# Patient Record
Sex: MALE | Race: BLACK OR AFRICAN AMERICAN | Employment: OTHER | URBAN - METROPOLITAN AREA
[De-identification: names, ages, dates, MRNs, and addresses within clinical notes are randomized per-mention and may not be internally consistent; named-entity substitution may affect disease eponyms.]

---

## 2023-12-13 ENCOUNTER — HOSPITAL ENCOUNTER (EMERGENCY)
Age: 63
Discharge: HOME | End: 2023-12-13
Attending: EMERGENCY MEDICINE
Payer: COMMERCIAL

## 2023-12-13 VITALS
SYSTOLIC BLOOD PRESSURE: 174 MMHG | RESPIRATION RATE: 17 BRPM | HEART RATE: 79 BPM | TEMPERATURE: 97.7 F | WEIGHT: 315 LBS | OXYGEN SATURATION: 96 % | DIASTOLIC BLOOD PRESSURE: 71 MMHG

## 2023-12-13 DIAGNOSIS — N18.5 ANEMIA DUE TO STAGE 5 CHRONIC KIDNEY DISEASE, NOT ON CHRONIC DIALYSIS (HCC): Primary | ICD-10-CM

## 2023-12-13 DIAGNOSIS — D63.1 ANEMIA DUE TO STAGE 5 CHRONIC KIDNEY DISEASE, NOT ON CHRONIC DIALYSIS (HCC): Primary | ICD-10-CM

## 2023-12-13 LAB
ABO + RH BLD: NORMAL
ACANTHOCYTES BLD QL SMEAR: ABNORMAL
ANION GAP SERPL CALCULATED.3IONS-SCNC: 17 MMOL/L (ref 3–16)
ANISOCYTOSIS BLD QL SMEAR: ABNORMAL
BASOPHILS # BLD: 0 K/UL (ref 0–0.2)
BASOPHILS NFR BLD: 1.2 %
BLD GP AB SCN SERPL QL: NORMAL
BLOOD BANK DISPENSE STATUS: NORMAL
BLOOD BANK PRODUCT CODE: NORMAL
BPU ID: NORMAL
BUN SERPL-MCNC: 108 MG/DL (ref 7–20)
CALCIUM SERPL-MCNC: 7.3 MG/DL (ref 8.3–10.6)
CHLORIDE SERPL-SCNC: 104 MMOL/L (ref 99–110)
CO2 SERPL-SCNC: 16 MMOL/L (ref 21–32)
CREAT SERPL-MCNC: 11.5 MG/DL (ref 0.8–1.3)
DACRYOCYTES BLD QL SMEAR: ABNORMAL
DEPRECATED RDW RBC AUTO: 16.2 % (ref 12.4–15.4)
DESCRIPTION BLOOD BANK: NORMAL
EOSINOPHIL # BLD: 0.3 K/UL (ref 0–0.6)
EOSINOPHIL NFR BLD: 6.9 %
GFR SERPLBLD CREATININE-BSD FMLA CKD-EPI: 4 ML/MIN/{1.73_M2}
GLUCOSE SERPL-MCNC: 110 MG/DL (ref 70–99)
HCT VFR BLD AUTO: 19 % (ref 40.5–52.5)
HGB BLD-MCNC: 6.2 G/DL (ref 13.5–17.5)
LYMPHOCYTES # BLD: 1 K/UL (ref 1–5.1)
LYMPHOCYTES NFR BLD: 24.9 %
MCH RBC QN AUTO: 27.6 PG (ref 26–34)
MCHC RBC AUTO-ENTMCNC: 32.7 G/DL (ref 31–36)
MCV RBC AUTO: 84.2 FL (ref 80–100)
MICROCYTES BLD QL SMEAR: ABNORMAL
MONOCYTES # BLD: 0.5 K/UL (ref 0–1.3)
MONOCYTES NFR BLD: 12.5 %
NEUTROPHILS # BLD: 2.2 K/UL (ref 1.7–7.7)
NEUTROPHILS NFR BLD: 54.5 %
OVALOCYTES BLD QL SMEAR: ABNORMAL
PLATELET # BLD AUTO: 179 K/UL (ref 135–450)
PMV BLD AUTO: 7.5 FL (ref 5–10.5)
POTASSIUM SERPL-SCNC: 5.5 MMOL/L (ref 3.5–5.1)
RBC # BLD AUTO: 2.25 M/UL (ref 4.2–5.9)
SCHISTOCYTES BLD QL SMEAR: ABNORMAL
SODIUM SERPL-SCNC: 137 MMOL/L (ref 136–145)
WBC # BLD AUTO: 4 K/UL (ref 4–11)

## 2023-12-13 PROCEDURE — P9016 RBC LEUKOCYTES REDUCED: HCPCS

## 2023-12-13 PROCEDURE — 86900 BLOOD TYPING SEROLOGIC ABO: CPT

## 2023-12-13 PROCEDURE — 86850 RBC ANTIBODY SCREEN: CPT

## 2023-12-13 PROCEDURE — 36430 TRANSFUSION BLD/BLD COMPNT: CPT

## 2023-12-13 PROCEDURE — 86901 BLOOD TYPING SEROLOGIC RH(D): CPT

## 2023-12-13 PROCEDURE — 80048 BASIC METABOLIC PNL TOTAL CA: CPT

## 2023-12-13 PROCEDURE — 93005 ELECTROCARDIOGRAM TRACING: CPT

## 2023-12-13 PROCEDURE — 86923 COMPATIBILITY TEST ELECTRIC: CPT

## 2023-12-13 PROCEDURE — 85025 COMPLETE CBC W/AUTO DIFF WBC: CPT

## 2023-12-13 PROCEDURE — 36415 COLL VENOUS BLD VENIPUNCTURE: CPT

## 2023-12-13 PROCEDURE — 99285 EMERGENCY DEPT VISIT HI MDM: CPT

## 2023-12-13 RX ORDER — SODIUM CHLORIDE 9 MG/ML
INJECTION, SOLUTION INTRAVENOUS PRN
Status: DISCONTINUED | OUTPATIENT
Start: 2023-12-13 | End: 2023-12-14 | Stop reason: HOSPADM

## 2023-12-13 ASSESSMENT — PAIN - FUNCTIONAL ASSESSMENT
PAIN_FUNCTIONAL_ASSESSMENT: NONE - DENIES PAIN
PAIN_FUNCTIONAL_ASSESSMENT: NONE - DENIES PAIN

## 2023-12-13 ASSESSMENT — PAIN SCALES - GENERAL
PAINLEVEL_OUTOF10: 0
PAINLEVEL_OUTOF10: 0

## 2023-12-14 LAB
EKG ATRIAL RATE: 67 BPM
EKG DIAGNOSIS: NORMAL
EKG P AXIS: 51 DEGREES
EKG P-R INTERVAL: 192 MS
EKG Q-T INTERVAL: 450 MS
EKG QRS DURATION: 98 MS
EKG QTC CALCULATION (BAZETT): 475 MS
EKG R AXIS: -9 DEGREES
EKG T AXIS: 20 DEGREES
EKG VENTRICULAR RATE: 67 BPM

## 2023-12-14 NOTE — ED NOTES
Pt discharged to home. Discharge instructions reviewed with patient. All questions answered, no concerns noted. Pt encouraged to return to emergency department if they have any new or worsening symptoms. Pt alert and oriented, resp even and unlabored, skin natural in color, no signs of acute distress.         Barbara Collins RN  12/13/23 [id number]

## 2023-12-14 NOTE — DISCHARGE INSTRUCTIONS
Today you were seen in the emergency department for anemia. We think this is all related to your chronic kidney disease. You were prescribed shots of medication that will stimulate your bone marrow to make more red blood cells. You need to talk to your nephrologist about how you can start receiving that. Additionally your potassium continues to be high. They recommended that you restart your medicine called Veltassa which can help lower your potassium levels. High levels of potassium can be dangerous I recommend you restart that. Please seek care or return to emergency department if your symptoms worsen or do not resolve. In addition, return if:  -You have a fever (greater than 101 degrees)  -You have chest pain, shortness of breath, abdominal pain, or uncontrollable vomiting  -You are unable to eat or drink  -You pass out  -You have difficulty moving your arms or legs   -You have difficulty speaking or slurred speech  -Or you have any concern that you feel needs urgent physician evaluation    Follow-Up/Future Appointments:  DILAN Shirley NP  [address]  [phone]    Schedule an appointment as soon as possible for a visit       No future appointments.   Discharge Medications:  New Prescriptions    No medications on file     Thank you for allowing me to be a part of your care today - Dr. Chato Stovall

## 2023-12-14 NOTE — CONSENT
Informed Consent for Blood Component Transfusion Note    I have discussed with the patient the rationale for blood component transfusion; its benefits in treating or preventing fatigue, organ damage, or death; and its risk which includes mild transfusion reactions, rare risk of blood borne infection, or more serious but rare reactions. I have discussed the alternatives to transfusion, including the risk and consequences of not receiving transfusion. The patient had an opportunity to ask questions and had agreed to proceed with transfusion of blood components.     Electronically signed by Shirley Holliday MD on 12/13/23 at 8:01 PM EST

## 2025-04-04 ENCOUNTER — HOSPITAL ENCOUNTER (INPATIENT)
Age: 65
LOS: 2 days | Discharge: HOME HEALTH | DRG: 640 | End: 2025-04-06
Attending: EMERGENCY MEDICINE | Admitting: STUDENT IN AN ORGANIZED HEALTH CARE EDUCATION/TRAINING PROGRAM
Payer: COMMERCIAL

## 2025-04-04 ENCOUNTER — APPOINTMENT (OUTPATIENT)
Dept: GENERAL RADIOLOGY | Age: 65
DRG: 640 | End: 2025-04-04
Payer: COMMERCIAL

## 2025-04-04 DIAGNOSIS — E87.5 HYPERKALEMIA: Primary | ICD-10-CM

## 2025-04-04 DIAGNOSIS — Z98.890 S/P RIGHT KNEE SURGERY: ICD-10-CM

## 2025-04-04 PROBLEM — Z99.2 ESRD ON DIALYSIS (HCC): Status: ACTIVE | Noted: 2025-04-04

## 2025-04-04 PROBLEM — N18.6 ESRD ON DIALYSIS (HCC): Status: ACTIVE | Noted: 2025-04-04

## 2025-04-04 LAB
ALBUMIN SERPL-MCNC: 3.4 G/DL (ref 3.4–5)
ALBUMIN/GLOB SERPL: 1 {RATIO} (ref 1.1–2.2)
ALP SERPL-CCNC: 78 U/L (ref 40–129)
ALT SERPL-CCNC: <5 U/L (ref 10–40)
ANION GAP SERPL CALCULATED.3IONS-SCNC: 23 MMOL/L (ref 3–16)
AST SERPL-CCNC: 20 U/L (ref 15–37)
BASOPHILS # BLD: 0 K/UL (ref 0–0.2)
BASOPHILS NFR BLD: 1 %
BILIRUB SERPL-MCNC: 0.4 MG/DL (ref 0–1)
BUN SERPL-MCNC: 149 MG/DL (ref 7–20)
CALCIUM SERPL-MCNC: 8.9 MG/DL (ref 8.3–10.6)
CHLORIDE SERPL-SCNC: 92 MMOL/L (ref 99–110)
CO2 SERPL-SCNC: 17 MMOL/L (ref 21–32)
CREAT SERPL-MCNC: 15.2 MG/DL (ref 0.8–1.3)
DEPRECATED RDW RBC AUTO: 16.4 % (ref 12.4–15.4)
EKG ATRIAL RATE: 65 BPM
EKG DIAGNOSIS: NORMAL
EKG P AXIS: 36 DEGREES
EKG P-R INTERVAL: 208 MS
EKG Q-T INTERVAL: 416 MS
EKG QRS DURATION: 112 MS
EKG QTC CALCULATION (BAZETT): 432 MS
EKG R AXIS: -30 DEGREES
EKG T AXIS: 18 DEGREES
EKG VENTRICULAR RATE: 65 BPM
EOSINOPHIL # BLD: 0.2 K/UL (ref 0–0.6)
EOSINOPHIL NFR BLD: 3.5 %
FERRITIN SERPL IA-MCNC: 1238 NG/ML (ref 30–400)
FLUAV RNA RESP QL NAA+PROBE: NOT DETECTED
FLUBV RNA RESP QL NAA+PROBE: NOT DETECTED
GFR SERPLBLD CREATININE-BSD FMLA CKD-EPI: 3 ML/MIN/{1.73_M2}
GLUCOSE BLD-MCNC: 175 MG/DL (ref 70–99)
GLUCOSE SERPL-MCNC: 100 MG/DL (ref 70–99)
HCT VFR BLD AUTO: 28.4 % (ref 40.5–52.5)
HGB BLD-MCNC: 9.4 G/DL (ref 13.5–17.5)
LYMPHOCYTES # BLD: 1.1 K/UL (ref 1–5.1)
LYMPHOCYTES NFR BLD: 23.8 %
MCH RBC QN AUTO: 30.2 PG (ref 26–34)
MCHC RBC AUTO-ENTMCNC: 33 G/DL (ref 31–36)
MCV RBC AUTO: 91.3 FL (ref 80–100)
MONOCYTES # BLD: 0.7 K/UL (ref 0–1.3)
MONOCYTES NFR BLD: 14.3 %
NEUTROPHILS # BLD: 2.7 K/UL (ref 1.7–7.7)
NEUTROPHILS NFR BLD: 57.4 %
NT-PROBNP SERPL-MCNC: 2475 PG/ML (ref 0–124)
PERFORMED ON: ABNORMAL
PLATELET # BLD AUTO: 275 K/UL (ref 135–450)
PMV BLD AUTO: 7.4 FL (ref 5–10.5)
POTASSIUM SERPL-SCNC: 6.4 MMOL/L (ref 3.5–5.1)
PROT SERPL-MCNC: 6.8 G/DL (ref 6.4–8.2)
RBC # BLD AUTO: 3.11 M/UL (ref 4.2–5.9)
SARS-COV-2 RNA RESP QL NAA+PROBE: NOT DETECTED
SODIUM SERPL-SCNC: 132 MMOL/L (ref 136–145)
TROPONIN, HIGH SENSITIVITY: 162 NG/L (ref 0–22)
TROPONIN, HIGH SENSITIVITY: 166 NG/L (ref 0–22)
WBC # BLD AUTO: 4.6 K/UL (ref 4–11)

## 2025-04-04 PROCEDURE — 6360000002 HC RX W HCPCS: Performed by: STUDENT IN AN ORGANIZED HEALTH CARE EDUCATION/TRAINING PROGRAM

## 2025-04-04 PROCEDURE — 83880 ASSAY OF NATRIURETIC PEPTIDE: CPT

## 2025-04-04 PROCEDURE — 71045 X-RAY EXAM CHEST 1 VIEW: CPT

## 2025-04-04 PROCEDURE — 82728 ASSAY OF FERRITIN: CPT

## 2025-04-04 PROCEDURE — 36415 COLL VENOUS BLD VENIPUNCTURE: CPT

## 2025-04-04 PROCEDURE — 83550 IRON BINDING TEST: CPT

## 2025-04-04 PROCEDURE — 83540 ASSAY OF IRON: CPT

## 2025-04-04 PROCEDURE — 90935 HEMODIALYSIS ONE EVALUATION: CPT

## 2025-04-04 PROCEDURE — 5A1D70Z PERFORMANCE OF URINARY FILTRATION, INTERMITTENT, LESS THAN 6 HOURS PER DAY: ICD-10-PCS | Performed by: STUDENT IN AN ORGANIZED HEALTH CARE EDUCATION/TRAINING PROGRAM

## 2025-04-04 PROCEDURE — 6370000000 HC RX 637 (ALT 250 FOR IP): Performed by: STUDENT IN AN ORGANIZED HEALTH CARE EDUCATION/TRAINING PROGRAM

## 2025-04-04 PROCEDURE — 99285 EMERGENCY DEPT VISIT HI MDM: CPT

## 2025-04-04 PROCEDURE — 80053 COMPREHEN METABOLIC PANEL: CPT

## 2025-04-04 PROCEDURE — 2060000000 HC ICU INTERMEDIATE R&B

## 2025-04-04 PROCEDURE — 2500000003 HC RX 250 WO HCPCS: Performed by: STUDENT IN AN ORGANIZED HEALTH CARE EDUCATION/TRAINING PROGRAM

## 2025-04-04 PROCEDURE — 93005 ELECTROCARDIOGRAM TRACING: CPT

## 2025-04-04 PROCEDURE — 87636 SARSCOV2 & INF A&B AMP PRB: CPT

## 2025-04-04 PROCEDURE — 84484 ASSAY OF TROPONIN QUANT: CPT

## 2025-04-04 PROCEDURE — 85025 COMPLETE CBC W/AUTO DIFF WBC: CPT

## 2025-04-04 RX ORDER — ACETAMINOPHEN 650 MG/1
650 SUPPOSITORY RECTAL EVERY 6 HOURS PRN
Status: DISCONTINUED | OUTPATIENT
Start: 2025-04-04 | End: 2025-04-06 | Stop reason: HOSPADM

## 2025-04-04 RX ORDER — INSULIN LISPRO 100 [IU]/ML
0-4 INJECTION, SOLUTION INTRAVENOUS; SUBCUTANEOUS
Status: DISCONTINUED | OUTPATIENT
Start: 2025-04-04 | End: 2025-04-06 | Stop reason: HOSPADM

## 2025-04-04 RX ORDER — FLUTICASONE PROPIONATE AND SALMETEROL 250; 50 UG/1; UG/1
1 POWDER RESPIRATORY (INHALATION) EVERY 12 HOURS
COMMUNITY

## 2025-04-04 RX ORDER — ALBUTEROL SULFATE 90 UG/1
2 INHALANT RESPIRATORY (INHALATION) EVERY 8 HOURS PRN
COMMUNITY

## 2025-04-04 RX ORDER — ONDANSETRON 2 MG/ML
4 INJECTION INTRAMUSCULAR; INTRAVENOUS EVERY 6 HOURS PRN
Status: DISCONTINUED | OUTPATIENT
Start: 2025-04-04 | End: 2025-04-06 | Stop reason: HOSPADM

## 2025-04-04 RX ORDER — ISOSORBIDE MONONITRATE 60 MG/1
120 TABLET, EXTENDED RELEASE ORAL DAILY
COMMUNITY

## 2025-04-04 RX ORDER — ATORVASTATIN CALCIUM 20 MG/1
20 TABLET, FILM COATED ORAL DAILY
Status: DISCONTINUED | OUTPATIENT
Start: 2025-04-04 | End: 2025-04-06 | Stop reason: HOSPADM

## 2025-04-04 RX ORDER — FUROSEMIDE 40 MG/1
40 TABLET ORAL 2 TIMES DAILY
COMMUNITY

## 2025-04-04 RX ORDER — CALCIUM ACETATE 667 MG/1
2001 TABLET ORAL 3 TIMES DAILY
COMMUNITY

## 2025-04-04 RX ORDER — ASPIRIN 81 MG/1
81 TABLET, CHEWABLE ORAL DAILY
Status: DISCONTINUED | OUTPATIENT
Start: 2025-04-05 | End: 2025-04-06 | Stop reason: HOSPADM

## 2025-04-04 RX ORDER — OXYBUTYNIN CHLORIDE 5 MG/1
10 TABLET ORAL 2 TIMES DAILY
Status: DISCONTINUED | OUTPATIENT
Start: 2025-04-04 | End: 2025-04-06 | Stop reason: HOSPADM

## 2025-04-04 RX ORDER — GABAPENTIN 100 MG/1
100 CAPSULE ORAL 3 TIMES DAILY
COMMUNITY

## 2025-04-04 RX ORDER — CEPHALEXIN 500 MG/1
500 CAPSULE ORAL 3 TIMES DAILY
Status: ON HOLD | COMMUNITY
End: 2025-04-05 | Stop reason: HOSPADM

## 2025-04-04 RX ORDER — HEPARIN SODIUM 5000 [USP'U]/ML
5000 INJECTION, SOLUTION INTRAVENOUS; SUBCUTANEOUS EVERY 8 HOURS SCHEDULED
Status: DISCONTINUED | OUTPATIENT
Start: 2025-04-04 | End: 2025-04-06 | Stop reason: HOSPADM

## 2025-04-04 RX ORDER — OMEGA-3S/DHA/EPA/FISH OIL/D3 300MG-1000
2000 CAPSULE ORAL DAILY
COMMUNITY

## 2025-04-04 RX ORDER — METOPROLOL SUCCINATE 25 MG/1
25 TABLET, EXTENDED RELEASE ORAL 2 TIMES DAILY
Status: DISCONTINUED | OUTPATIENT
Start: 2025-04-04 | End: 2025-04-06 | Stop reason: HOSPADM

## 2025-04-04 RX ORDER — GLUCAGON 1 MG/ML
1 KIT INJECTION PRN
Status: DISCONTINUED | OUTPATIENT
Start: 2025-04-04 | End: 2025-04-06 | Stop reason: HOSPADM

## 2025-04-04 RX ORDER — CALCIUM ACETATE 667 MG/1
2001 CAPSULE ORAL 3 TIMES DAILY
Status: DISCONTINUED | OUTPATIENT
Start: 2025-04-04 | End: 2025-04-06 | Stop reason: HOSPADM

## 2025-04-04 RX ORDER — CETIRIZINE HYDROCHLORIDE 10 MG/1
5 TABLET ORAL DAILY
Status: DISCONTINUED | OUTPATIENT
Start: 2025-04-05 | End: 2025-04-06 | Stop reason: HOSPADM

## 2025-04-04 RX ORDER — OXYCODONE HYDROCHLORIDE 5 MG/1
5 TABLET ORAL EVERY 4 HOURS PRN
Refills: 0 | Status: DISCONTINUED | OUTPATIENT
Start: 2025-04-04 | End: 2025-04-06 | Stop reason: HOSPADM

## 2025-04-04 RX ORDER — OXYBUTYNIN CHLORIDE 5 MG/1
10 TABLET ORAL 2 TIMES DAILY
COMMUNITY

## 2025-04-04 RX ORDER — OXYCODONE HYDROCHLORIDE 5 MG/1
5 TABLET ORAL EVERY 4 HOURS PRN
Status: ON HOLD | COMMUNITY
End: 2025-04-06

## 2025-04-04 RX ORDER — SODIUM CHLORIDE 9 MG/ML
INJECTION, SOLUTION INTRAVENOUS PRN
Status: DISCONTINUED | OUTPATIENT
Start: 2025-04-04 | End: 2025-04-06 | Stop reason: HOSPADM

## 2025-04-04 RX ORDER — EPOETIN ALFA-EPBX 2000 [IU]/ML
2000 INJECTION, SOLUTION INTRAVENOUS; SUBCUTANEOUS
COMMUNITY

## 2025-04-04 RX ORDER — ATORVASTATIN CALCIUM 20 MG/1
20 TABLET, FILM COATED ORAL DAILY
COMMUNITY

## 2025-04-04 RX ORDER — LACOSAMIDE 50 MG/1
200 TABLET ORAL 2 TIMES DAILY
Status: DISCONTINUED | OUTPATIENT
Start: 2025-04-04 | End: 2025-04-06 | Stop reason: HOSPADM

## 2025-04-04 RX ORDER — ISOSORBIDE MONONITRATE 60 MG/1
120 TABLET, EXTENDED RELEASE ORAL DAILY
Status: DISCONTINUED | OUTPATIENT
Start: 2025-04-05 | End: 2025-04-06 | Stop reason: HOSPADM

## 2025-04-04 RX ORDER — METOPROLOL SUCCINATE 25 MG/1
25 TABLET, EXTENDED RELEASE ORAL DAILY
Status: DISCONTINUED | OUTPATIENT
Start: 2025-04-05 | End: 2025-04-04

## 2025-04-04 RX ORDER — GABAPENTIN 100 MG/1
100 CAPSULE ORAL 3 TIMES DAILY
Status: DISCONTINUED | OUTPATIENT
Start: 2025-04-04 | End: 2025-04-06 | Stop reason: HOSPADM

## 2025-04-04 RX ORDER — METOPROLOL SUCCINATE 25 MG/1
25 TABLET, EXTENDED RELEASE ORAL 2 TIMES DAILY
COMMUNITY

## 2025-04-04 RX ORDER — INDOMETHACIN 25 MG/1
50 CAPSULE ORAL ONCE
Status: DISCONTINUED | OUTPATIENT
Start: 2025-04-04 | End: 2025-04-06 | Stop reason: HOSPADM

## 2025-04-04 RX ORDER — ASPIRIN 81 MG/1
81 TABLET, CHEWABLE ORAL 2 TIMES DAILY
COMMUNITY

## 2025-04-04 RX ORDER — DEXTROSE MONOHYDRATE 100 MG/ML
INJECTION, SOLUTION INTRAVENOUS CONTINUOUS PRN
Status: DISCONTINUED | OUTPATIENT
Start: 2025-04-04 | End: 2025-04-06 | Stop reason: HOSPADM

## 2025-04-04 RX ORDER — INSULIN GLARGINE 100 [IU]/ML
15 INJECTION, SOLUTION SUBCUTANEOUS NIGHTLY
COMMUNITY

## 2025-04-04 RX ORDER — INSULIN ASPART 100 [IU]/ML
10 INJECTION, SOLUTION INTRAVENOUS; SUBCUTANEOUS 3 TIMES DAILY
COMMUNITY

## 2025-04-04 RX ORDER — SODIUM CHLORIDE 0.9 % (FLUSH) 0.9 %
5-40 SYRINGE (ML) INJECTION PRN
Status: DISCONTINUED | OUTPATIENT
Start: 2025-04-04 | End: 2025-04-06 | Stop reason: HOSPADM

## 2025-04-04 RX ORDER — ACETAMINOPHEN 325 MG/1
650 TABLET ORAL EVERY 6 HOURS PRN
Status: DISCONTINUED | OUTPATIENT
Start: 2025-04-04 | End: 2025-04-06 | Stop reason: HOSPADM

## 2025-04-04 RX ORDER — CETIRIZINE HYDROCHLORIDE 10 MG/1
10 TABLET ORAL DAILY
Status: DISCONTINUED | OUTPATIENT
Start: 2025-04-04 | End: 2025-04-04

## 2025-04-04 RX ORDER — SODIUM CHLORIDE 0.9 % (FLUSH) 0.9 %
5-40 SYRINGE (ML) INJECTION EVERY 12 HOURS SCHEDULED
Status: DISCONTINUED | OUTPATIENT
Start: 2025-04-04 | End: 2025-04-06 | Stop reason: HOSPADM

## 2025-04-04 RX ORDER — CALCIUM GLUCONATE 20 MG/ML
1000 INJECTION, SOLUTION INTRAVENOUS ONCE
Status: DISCONTINUED | OUTPATIENT
Start: 2025-04-04 | End: 2025-04-06 | Stop reason: HOSPADM

## 2025-04-04 RX ORDER — POLYETHYLENE GLYCOL 3350 17 G/17G
17 POWDER, FOR SOLUTION ORAL DAILY PRN
Status: DISCONTINUED | OUTPATIENT
Start: 2025-04-04 | End: 2025-04-06 | Stop reason: HOSPADM

## 2025-04-04 RX ORDER — TENAPANOR 31.9 MG/1
30 TABLET, FILM COATED ORAL
COMMUNITY

## 2025-04-04 RX ORDER — FUROSEMIDE 40 MG/1
80 TABLET ORAL 2 TIMES DAILY
Status: DISCONTINUED | OUTPATIENT
Start: 2025-04-04 | End: 2025-04-06 | Stop reason: HOSPADM

## 2025-04-04 RX ORDER — FEXOFENADINE HCL 180 MG/1
180 TABLET ORAL DAILY
COMMUNITY

## 2025-04-04 RX ORDER — ONDANSETRON 4 MG/1
4 TABLET, ORALLY DISINTEGRATING ORAL EVERY 8 HOURS PRN
Status: DISCONTINUED | OUTPATIENT
Start: 2025-04-04 | End: 2025-04-06 | Stop reason: HOSPADM

## 2025-04-04 RX ADMIN — OXYCODONE HYDROCHLORIDE 5 MG: 5 TABLET ORAL at 23:43

## 2025-04-04 RX ADMIN — CALCIUM ACETATE 2001 MG: 667 CAPSULE ORAL at 20:30

## 2025-04-04 RX ADMIN — SODIUM CHLORIDE, PRESERVATIVE FREE 10 ML: 5 INJECTION INTRAVENOUS at 20:28

## 2025-04-04 RX ADMIN — METOPROLOL SUCCINATE 25 MG: 25 TABLET, EXTENDED RELEASE ORAL at 20:30

## 2025-04-04 RX ADMIN — FUROSEMIDE 80 MG: 40 TABLET ORAL at 20:29

## 2025-04-04 RX ADMIN — HEPARIN SODIUM 5000 UNITS: 5000 INJECTION INTRAVENOUS; SUBCUTANEOUS at 22:16

## 2025-04-04 RX ADMIN — OXYBUTYNIN CHLORIDE 10 MG: 5 TABLET ORAL at 20:30

## 2025-04-04 RX ADMIN — LACOSAMIDE 200 MG: 50 TABLET, FILM COATED ORAL at 20:30

## 2025-04-04 RX ADMIN — GABAPENTIN 100 MG: 100 CAPSULE ORAL at 21:05

## 2025-04-04 ASSESSMENT — PAIN SCALES - GENERAL: PAINLEVEL_OUTOF10: 5

## 2025-04-04 ASSESSMENT — PAIN DESCRIPTION - ORIENTATION: ORIENTATION: RIGHT

## 2025-04-04 ASSESSMENT — PAIN DESCRIPTION - PAIN TYPE: TYPE: ACUTE PAIN;CHRONIC PAIN

## 2025-04-04 ASSESSMENT — PAIN DESCRIPTION - LOCATION: LOCATION: KNEE

## 2025-04-04 ASSESSMENT — PAIN DESCRIPTION - ONSET: ONSET: ON-GOING

## 2025-04-04 ASSESSMENT — PAIN DESCRIPTION - FREQUENCY: FREQUENCY: INTERMITTENT

## 2025-04-04 ASSESSMENT — PAIN DESCRIPTION - DESCRIPTORS: DESCRIPTORS: DISCOMFORT

## 2025-04-04 ASSESSMENT — PAIN - FUNCTIONAL ASSESSMENT: PAIN_FUNCTIONAL_ASSESSMENT: ACTIVITIES ARE NOT PREVENTED

## 2025-04-04 NOTE — H&P
Hospital Medicine History & Physical      Date of Admission: 4/4/2025    Date of Service:  Pt seen/examined on 4/4/2025    [x]Admitted to Inpatient with expected LOS greater than two midnights due to medical therapy.  []Placed in Observation status.    Chief Admission Complaint: Missed dialysis    Presenting Admission History:      64 y.o. male who presented to [facility] with multiple missed dialysis sessions.  PMHx significant for ESRD on HD, type 2 diabetes on insulin, hypertension, obesity, history prostate cancer, history of seizures, BALBINA, CAD.    Patient presents to the emergency department because he has missed multiple dialysis episodes.  He notes that he felt like the dialysis staff at his current location has been rude to him as well and he tries to make the appointments but there have been many factors that have been out of his control that have stopped him from going to dialysis.  He notes that has been having worsening lower extremity edema and overall swelling.  Denies any chest pain shortness of breath.  Said that he had some shaking in his extremities that happen sometimes when he is off his seizure medications but discussed that this could also be caused by not undergoing dialysis for a while.  Denies any fevers or chills.  No other acute complaints.    In the ED, vital signs stable.  BMP shows sodium 132, potassium 6.4, bicarb of 17 with anion gap of 23.  CBC showed hemoglobin 9.4, otherwise unremarkable.  Chest x-ray no acute process.  EKG normal sinus rhythm, no significant EKG changes with the hyperkalemia.    Nephrology was consulted in the emergency department and orders placed for urgent dialysis.  Discussed with nephrology, plans for session today and tomorrow.    Assessment/Plan:      Current Principal Problem:  ESRD on dialysis (HCC)    #ESRD on HD: Multiple missed dialysis sessions in the past week  #Hyperkalemia secondary to above  #High anion gap metabolic acidosis

## 2025-04-04 NOTE — PLAN OF CARE
[facility] Hospitalist brief note  Consult received.  Case reviewed with ER physician    Full note to follow.    Dionicio Earl MD doNOT admit for   Private PCP group  Dr Matute admits for:  Clarke Chávez admits for  Monalisa Sandoval Norma)    Thanks  Joesph Keenan MD

## 2025-04-04 NOTE — CONSULTS
Clinical Pharmacy Consult Note  Medication History     Admit Date: 4/4/2025    Pharmacy consulted to verify facility medication list by Dr. Esquivel    List of current medications patient is taking is complete. Home Medication list in Epic updated to reflect changes noted below.    Source of information: [facility] Facility Medication List    Patient's home pharmacy: [facility] [address] [facility] [phone]     Changes made to medication list:     Medications removed: (include reason, ex: therapy completed, patient no longer taking, etc.)  None    Medications added to medication profile not on Facility medication list:   Arformeterol /Budesonide Neb soln   Twice Daily  Cetirizine 10 mg daily (Formulary sub for Fexofenadine 180 mg)  Lacosamide 200 mg BID    Other notes:   none    Current Outpatient Medications   Medication Instructions    albuterol sulfate HFA (VENTOLIN HFA) 108 (90 Base) MCG/ACT inhaler 2 puffs, EVERY 8 HOURS PRN    aspirin 81 mg, Oral, 2 times daily    atorvastatin (LIPITOR) 20 mg, DAILY    calcium acetate 2,001 mg, 3 times daily    cephALEXin (KEFLEX) 500 mg, 3 TIMES DAILY    cholecalciferol (VITAMIN D3) 2,000 Units, DAILY    fexofenadine (ALLEGRA) 180 mg, DAILY    fluticasone-salmeterol (ADVAIR) 250-50 MCG/ACT AEPB diskus inhaler 1 puff, EVERY 12 HOURS    furosemide (LASIX) 40 mg, 2 TIMES DAILY    gabapentin (NEURONTIN) 100 mg, 3 TIMES DAILY    insulin aspart (NOVOLOG) 10 Units, 3 TIMES DAILY    insulin glargine (LANTUS) 15 Units, NIGHTLY    isosorbide mononitrate (IMDUR) 120 mg, DAILY    metoprolol succinate (TOPROL XL) 25 mg, 2 times daily    oxyBUTYnin (DITROPAN) 10 mg, 2 TIMES DAILY    oxyCODONE (ROXICODONE) 5 mg, Oral, EVERY 4 HOURS PRN    Retacrit 2,000 Units, EVERY MWF    Xphozah 30 mg, EVERY BEDTIME       Thank you for consulting pharmacy,    Ed MULLER [facility] [id number]   4/4/2025 10:33 PM

## 2025-04-04 NOTE — CONSULTS
Ph: [phone], Fax: [phone]                                     [facility]                                                   [address] [place], [place] [address]           Reason for admission:                 Brief Summary:     Neo Kwong is being seen by nephrology for ESRD.     Interval History and Plan:   Patient seen at bedside  Comfortable  /63  On room air  Labs reviewed            HD today for 2 hours ASAP and then 2 - 3 hours tomorrow to avoid cerebral dysequilibrium syndrome.   Medical treatment of hyperkalemia ordered while waiting for HD  UF as tolerated as patient is edematous /volume overloaded although on room air  Monitor Hb and lytes.   Follow BP   Epogen with HD  Check iron studies.   Dose medications according to GFR  Advised compliance with RRT as it can be life threatening.         Dialysis orders placed and informed [facility]      Thank you for allowing us to participate in this patient's care  In case of any question please call us at our 24 hour answering service [phone] or from 7 AM to 5 PM via [employer], [employer], Epic chat or cell phone.   Dr Laura Delgado MD      Assessment:     ESRD  On HD TTS   patient from [facility]  Access:  AVF in right arm. No steal concerns.   Volume status: Volume overloaded.        Hypertension        Anemia        Metabolic acidosis        HPI      Patient is a 64 y.o. male  with PMH significant for ESRD on HD, HTN, anemia non compliant with HD was sent from outpatient dialysis clinic for urgent dialysis per patient. Labs showed hyperkalemia and very high BUN and nephrology consulted for urgency dialysis.       Patient seen at bedside and appear comfortable on room air and denied SOB, chest pain, focal weakness, bleeding, fever, hand pains. Per patient he missed few HD sessions due to transport issues.         ROS:   Negative except as mentioned in HPI      Vitals:

## 2025-04-04 NOTE — CONSULTS
Consult received.  Labs and notes were reviewed.  Case was discussed with the staff.   patient from [facility]    Full note to follow.    Thanks  Nephrology  [address]  [place] [address]  Office: [phone]  Cell: [phone]  Fax: [phone]

## 2025-04-05 LAB
ANION GAP SERPL CALCULATED.3IONS-SCNC: 20 MMOL/L (ref 3–16)
BASOPHILS # BLD: 0 K/UL (ref 0–0.2)
BASOPHILS NFR BLD: 1.4 %
BUN SERPL-MCNC: 105 MG/DL (ref 7–20)
CALCIUM SERPL-MCNC: 8.8 MG/DL (ref 8.3–10.6)
CHLORIDE SERPL-SCNC: 94 MMOL/L (ref 99–110)
CO2 SERPL-SCNC: 21 MMOL/L (ref 21–32)
CREAT SERPL-MCNC: 12.7 MG/DL (ref 0.8–1.3)
DEPRECATED RDW RBC AUTO: 16.1 % (ref 12.4–15.4)
EOSINOPHIL # BLD: 0.2 K/UL (ref 0–0.6)
EOSINOPHIL NFR BLD: 5.3 %
GFR SERPLBLD CREATININE-BSD FMLA CKD-EPI: 4 ML/MIN/{1.73_M2}
GLUCOSE BLD-MCNC: 139 MG/DL (ref 70–99)
GLUCOSE BLD-MCNC: 145 MG/DL (ref 70–99)
GLUCOSE BLD-MCNC: 181 MG/DL (ref 70–99)
GLUCOSE SERPL-MCNC: 144 MG/DL (ref 70–99)
HCT VFR BLD AUTO: 29.5 % (ref 40.5–52.5)
HGB BLD-MCNC: 9.8 G/DL (ref 13.5–17.5)
IRON SATN MFR SERPL: 28 % (ref 20–50)
IRON SERPL-MCNC: 49 UG/DL (ref 59–158)
LYMPHOCYTES # BLD: 0.9 K/UL (ref 1–5.1)
LYMPHOCYTES NFR BLD: 27.8 %
MCH RBC QN AUTO: 29.7 PG (ref 26–34)
MCHC RBC AUTO-ENTMCNC: 33.1 G/DL (ref 31–36)
MCV RBC AUTO: 89.9 FL (ref 80–100)
MONOCYTES # BLD: 0.6 K/UL (ref 0–1.3)
MONOCYTES NFR BLD: 18.8 %
NEUTROPHILS # BLD: 1.5 K/UL (ref 1.7–7.7)
NEUTROPHILS NFR BLD: 46.7 %
PERFORMED ON: ABNORMAL
PHOSPHATE SERPL-MCNC: 7.6 MG/DL (ref 2.5–4.9)
PLATELET # BLD AUTO: 249 K/UL (ref 135–450)
PMV BLD AUTO: 7.4 FL (ref 5–10.5)
POTASSIUM SERPL-SCNC: 4.9 MMOL/L (ref 3.5–5.1)
RBC # BLD AUTO: 3.29 M/UL (ref 4.2–5.9)
SODIUM SERPL-SCNC: 135 MMOL/L (ref 136–145)
TIBC SERPL-MCNC: 177 UG/DL (ref 260–445)
WBC # BLD AUTO: 3.2 K/UL (ref 4–11)

## 2025-04-05 PROCEDURE — 36415 COLL VENOUS BLD VENIPUNCTURE: CPT

## 2025-04-05 PROCEDURE — 90935 HEMODIALYSIS ONE EVALUATION: CPT

## 2025-04-05 PROCEDURE — 2500000003 HC RX 250 WO HCPCS: Performed by: STUDENT IN AN ORGANIZED HEALTH CARE EDUCATION/TRAINING PROGRAM

## 2025-04-05 PROCEDURE — 80048 BASIC METABOLIC PNL TOTAL CA: CPT

## 2025-04-05 PROCEDURE — 6360000002 HC RX W HCPCS: Performed by: STUDENT IN AN ORGANIZED HEALTH CARE EDUCATION/TRAINING PROGRAM

## 2025-04-05 PROCEDURE — 84100 ASSAY OF PHOSPHORUS: CPT

## 2025-04-05 PROCEDURE — 6370000000 HC RX 637 (ALT 250 FOR IP): Performed by: STUDENT IN AN ORGANIZED HEALTH CARE EDUCATION/TRAINING PROGRAM

## 2025-04-05 PROCEDURE — 85025 COMPLETE CBC W/AUTO DIFF WBC: CPT

## 2025-04-05 PROCEDURE — 2060000000 HC ICU INTERMEDIATE R&B

## 2025-04-05 PROCEDURE — 83036 HEMOGLOBIN GLYCOSYLATED A1C: CPT

## 2025-04-05 RX ADMIN — ASPIRIN 81 MG: 81 TABLET, CHEWABLE ORAL at 09:43

## 2025-04-05 RX ADMIN — HEPARIN SODIUM 5000 UNITS: 5000 INJECTION INTRAVENOUS; SUBCUTANEOUS at 14:26

## 2025-04-05 RX ADMIN — SODIUM CHLORIDE, PRESERVATIVE FREE 10 ML: 5 INJECTION INTRAVENOUS at 09:43

## 2025-04-05 RX ADMIN — CALCIUM ACETATE 2001 MG: 667 CAPSULE ORAL at 09:43

## 2025-04-05 RX ADMIN — METOPROLOL SUCCINATE 25 MG: 25 TABLET, EXTENDED RELEASE ORAL at 21:22

## 2025-04-05 RX ADMIN — GABAPENTIN 100 MG: 100 CAPSULE ORAL at 09:43

## 2025-04-05 RX ADMIN — FUROSEMIDE 80 MG: 40 TABLET ORAL at 21:29

## 2025-04-05 RX ADMIN — CETIRIZINE HYDROCHLORIDE 5 MG: 10 TABLET, FILM COATED ORAL at 09:42

## 2025-04-05 RX ADMIN — CALCIUM ACETATE 2001 MG: 667 CAPSULE ORAL at 21:21

## 2025-04-05 RX ADMIN — ATORVASTATIN CALCIUM 20 MG: 20 TABLET, FILM COATED ORAL at 09:43

## 2025-04-05 RX ADMIN — LACOSAMIDE 200 MG: 50 TABLET, FILM COATED ORAL at 14:22

## 2025-04-05 RX ADMIN — LACOSAMIDE 200 MG: 50 TABLET, FILM COATED ORAL at 21:22

## 2025-04-05 RX ADMIN — GABAPENTIN 100 MG: 100 CAPSULE ORAL at 14:24

## 2025-04-05 RX ADMIN — CHOLECALCIFEROL (VITAMIN D3) 10 MCG (400 UNIT) TABLET 2000 UNITS: at 09:41

## 2025-04-05 RX ADMIN — HEPARIN SODIUM 5000 UNITS: 5000 INJECTION INTRAVENOUS; SUBCUTANEOUS at 21:25

## 2025-04-05 RX ADMIN — CALCIUM ACETATE 2001 MG: 667 CAPSULE ORAL at 14:24

## 2025-04-05 RX ADMIN — OXYBUTYNIN CHLORIDE 10 MG: 5 TABLET ORAL at 09:43

## 2025-04-05 RX ADMIN — ISOSORBIDE MONONITRATE 120 MG: 60 TABLET, EXTENDED RELEASE ORAL at 09:43

## 2025-04-05 RX ADMIN — INSULIN LISPRO 1 UNITS: 100 INJECTION, SOLUTION INTRAVENOUS; SUBCUTANEOUS at 14:25

## 2025-04-05 RX ADMIN — HEPARIN SODIUM 5000 UNITS: 5000 INJECTION INTRAVENOUS; SUBCUTANEOUS at 06:28

## 2025-04-05 RX ADMIN — GABAPENTIN 100 MG: 100 CAPSULE ORAL at 21:22

## 2025-04-05 RX ADMIN — OXYBUTYNIN CHLORIDE 10 MG: 5 TABLET ORAL at 21:21

## 2025-04-05 RX ADMIN — FUROSEMIDE 80 MG: 40 TABLET ORAL at 09:42

## 2025-04-05 RX ADMIN — METOPROLOL SUCCINATE 25 MG: 25 TABLET, EXTENDED RELEASE ORAL at 09:42

## 2025-04-05 ASSESSMENT — PAIN SCALES - GENERAL
PAINLEVEL_OUTOF10: 0

## 2025-04-05 NOTE — DISCHARGE INSTR - COC
Bowel: Yes  Bladder: Yes  Urinary Catheter: None   Colostomy/Ileostomy/Ileal Conduit: No       Date of Last BM:     Intake/Output Summary (Last 24 hours) at 4/5/2025 1109  Last data filed at 4/4/2025 2226  Gross per 24 hour   Intake 800 ml   Output 2500 ml   Net -1700 ml     I/O last 3 completed shifts:  In: 800 [P.O.:300]  Out: 2500     Safety Concerns:     None and At Risk for Falls    Impairments/Disabilities:      None    Nutrition Therapy:  Current Nutrition Therapy:   - Oral Diet:  General    Routes of Feeding: Oral  Liquids: Thin Liquids  Daily Fluid Restriction: no  Last Modified Barium Swallow with Video (Video Swallowing Test): not done    Treatments at the Time of Hospital Discharge:   Respiratory Treatments: CPAP/BiPAP at home  Oxygen Therapy:  is not on home oxygen therapy.  Ventilator:    - BiPAP    ,   only when sleeping  - CPAP   only when sleeping    Rehab Therapies:   Weight Bearing Status/Restrictions: No weight bearing restrictions  Other Medical Equipment (for information only, NOT a DME order):    Other Treatments:     Patient's personal belongings (please select all that are sent with patient):  Pants, shirt, jacket, shoes, socks    RN SIGNATURE:  Electronically signed by Kirsten Gil RN on 4/5/25 at 12:34 PM EDT    CASE MANAGEMENT/SOCIAL WORK SECTION    Inpatient Status Date: 4/4/25    Readmission Risk Assessment Score:  [facility] RISK OF UNPLANNED READMISSION 2.0             19.6 Total Score        Discharging to Facility/ Agency   Name:[facility]  [address] [place] [address]  Phone: [phone]  Fax: [phone]      / signature: Electronically signed by TITO LOAIZA on 4/6/25 at 1:03 PM EDT    PHYSICIAN SECTION    Prognosis: Good    Condition at Discharge: Stable    Rehab Potential (if transferring to Rehab): Good    Recommended Labs or Other Treatments After Discharge: Please make sure patient attends his dialysis

## 2025-04-05 NOTE — PLAN OF CARE
Problem: Chronic Conditions and Co-morbidities  Goal: Patient's chronic conditions and co-morbidity symptoms are monitored and maintained or improved  Outcome: Progressing  Flowsheets (Taken 4/4/2025 2030)  Care Plan - Patient's Chronic Conditions and Co-Morbidity Symptoms are Monitored and Maintained or Improved: Monitor and assess patient's chronic conditions and comorbid symptoms for stability, deterioration, or improvement     Problem: Safety - Adult  Goal: Free from fall injury  Outcome: Progressing     Problem: Risk for Elopement  Goal: Patient will not exit the unit/facility without proper excort  Outcome: Progressing  Flowsheets (Taken 4/4/2025 2030)  Nursing Interventions for Elopement Risk: Assist with personal care needs such as toileting, eating, dressing, as needed to reduce the risk of wandering     Problem: Metabolic/Fluid and Electrolytes - Adult  Goal: Electrolytes maintained within normal limits  Outcome: Progressing  Flowsheets (Taken 4/4/2025 2030)  Electrolytes maintained within normal limits: Monitor labs and assess patient for signs and symptoms of electrolyte imbalances     Problem: Metabolic/Fluid and Electrolytes - Adult  Goal: Hemodynamic stability and optimal renal function maintained  Outcome: Progressing  Flowsheets (Taken 4/4/2025 2030)  Hemodynamic stability and optimal renal function maintained: Monitor labs and assess for signs and symptoms of volume excess or deficit     Problem: Metabolic/Fluid and Electrolytes - Adult  Goal: Glucose maintained within prescribed range  Outcome: Progressing  Flowsheets (Taken 4/4/2025 2030)  Glucose maintained within prescribed range: Monitor blood glucose as ordered     Problem: Pain  Goal: Verbalizes/displays adequate comfort level or baseline comfort level  Outcome: Progressing  Flowsheets (Taken 4/4/2025 2340)  Verbalizes/displays adequate comfort level or baseline comfort level: Assess pain using appropriate pain scale

## 2025-04-05 NOTE — PLAN OF CARE
Was informed that patient was refused to go to his long-term care facility and now opted to go home with his wife.  I did evaluate the patient and he was oriented to person place and time and president.  He knew the consequences of him being a fall risk at home more so than in an LTC as well as he will not be cared for as in his LTC.  Patient acknowledged understanding this.  He also acknowledged understanding of complications of losing dialysis or missing dialysis such as dying.  I did recommend highly for patient to go to [facility] but patient declined several times. Patient does have capacity to make his decisions.  Patient decided to go home.

## 2025-04-05 NOTE — CARE COORDINATION
Case Management Assessment            Discharge Note                    Date / Time of Note: 4/5/2025 11:40 AM                  Discharge Note Completed by: TITO LOAIZA    Patient Name: Neo Kwong   YOB: 1960  Diagnosis: Hyperkalemia [E87.5]  ESRD on dialysis (HCC) [N18.6, Z99.2]   Date / Time: 4/4/2025  1:23 PM    Current PCP: Dionicio Earl MD  Clinic patient: No    Hospitalization in the last 30 days: No       Advance Directives:  Code Status: Full Code  [place] DNR form completed and on chart: Not Indicated    Financial:  Payor: [employer] / Plan: [employer] DUAL / Product Type: *No Product type* /      Pharmacy:  No Pharmacies Listed    Assistance purchasing medications?:    Assistance provided by Case Management: None at this time    Does patient want to participate in local refill/ meds to beds program?:      Meds To Beds General Rules:  1. Can ONLY be done Monday- Friday between 8:30am-5pm  2. Prescription(s) must be in pharmacy by 3pm to be filled same day  3.Copy of patient's insurance/ prescription drug card and patient face sheet must be sent along with the prescription(s)  4. Cost of Rx cannot be added to hospital bill. If financial assistance is needed, please contact unit  or ;  or  CANNOT provide pharmacy voucher for patients co-pays  5. Patients can then  the prescription on their way out of the hospital at discharge, or pharmacy can deliver to the bedside if staff is available. (payment due at time of pick-up or delivery - cash, check, or card accepted)     Able to afford home medications/ co-pay costs: Yes    ADLS:  Current PT AM-PAC Score:   /24  Current OT AM-PAC Score:   /24    DISCHARGE Disposition: Long Term Care Facility (LTC): [facility]  Phone: [phone]  Fax: [phone]    LOC at discharge: Long Term Care  BNO Completed: Yes    Notification completed in HENS/PAS?:  Not

## 2025-04-05 NOTE — DISCHARGE INSTRUCTIONS
Please follow up with your primary care physician within 1 week following discharge.   Please follow up with your nephrologist as needed  Please return to the hospital with any new or worsening symptoms.   Please do not miss anymore dialysis sessions as this can be life threatening.

## 2025-04-05 NOTE — DISCHARGE SUMMARY
\"PHUR\", \"LABCAST\", \"WBCUA\", \"RBCUA\", \"MUCUS\", \"TRICHOMONAS\", \"YEAST\", \"BACTERIA\", \"CLARITYU\", \"SPECGRAV\", \"LEUKOCYTESUR\", \"UROBILINOGEN\", \"BILIRUBINUR\", \"BLOODU\", \"GLUCOSEU\", \"KETUA\", \"AMORPHOUS\"  Urine Cultures: No results found for: \"LABURIN\"  Blood Cultures: No results found for: \"BC\"  No results found for: \"BLOODCULT2\"  Organism: No results found for: \"ORG\"    Time Spent Discharging patient 33 minutes    Electronically signed by Jaime Avina DO on 4/5/2025 at 5:03 PM

## 2025-04-06 VITALS
HEIGHT: 61 IN | DIASTOLIC BLOOD PRESSURE: 60 MMHG | RESPIRATION RATE: 20 BRPM | BODY MASS INDEX: 54.64 KG/M2 | WEIGHT: 289.4 LBS | OXYGEN SATURATION: 97 % | SYSTOLIC BLOOD PRESSURE: 133 MMHG | TEMPERATURE: 97.9 F | HEART RATE: 51 BPM

## 2025-04-06 LAB
EST. AVERAGE GLUCOSE BLD GHB EST-MCNC: 168.6 MG/DL
GLUCOSE BLD-MCNC: 128 MG/DL (ref 70–99)
GLUCOSE BLD-MCNC: 132 MG/DL (ref 70–99)
HBA1C MFR BLD: 7.5 %
PERFORMED ON: ABNORMAL
PERFORMED ON: ABNORMAL

## 2025-04-06 PROCEDURE — 97530 THERAPEUTIC ACTIVITIES: CPT

## 2025-04-06 PROCEDURE — 2500000003 HC RX 250 WO HCPCS: Performed by: STUDENT IN AN ORGANIZED HEALTH CARE EDUCATION/TRAINING PROGRAM

## 2025-04-06 PROCEDURE — 97535 SELF CARE MNGMENT TRAINING: CPT

## 2025-04-06 PROCEDURE — 6360000002 HC RX W HCPCS: Performed by: STUDENT IN AN ORGANIZED HEALTH CARE EDUCATION/TRAINING PROGRAM

## 2025-04-06 PROCEDURE — 97116 GAIT TRAINING THERAPY: CPT

## 2025-04-06 PROCEDURE — 6370000000 HC RX 637 (ALT 250 FOR IP): Performed by: STUDENT IN AN ORGANIZED HEALTH CARE EDUCATION/TRAINING PROGRAM

## 2025-04-06 PROCEDURE — 97161 PT EVAL LOW COMPLEX 20 MIN: CPT

## 2025-04-06 PROCEDURE — 97166 OT EVAL MOD COMPLEX 45 MIN: CPT

## 2025-04-06 RX ORDER — OXYCODONE HYDROCHLORIDE 5 MG/1
5 TABLET ORAL EVERY 8 HOURS PRN
Qty: 9 TABLET | Refills: 0 | Status: SHIPPED | OUTPATIENT
Start: 2025-04-06 | End: 2025-04-09

## 2025-04-06 RX ADMIN — CALCIUM ACETATE 2001 MG: 667 CAPSULE ORAL at 14:58

## 2025-04-06 RX ADMIN — GABAPENTIN 100 MG: 100 CAPSULE ORAL at 14:58

## 2025-04-06 RX ADMIN — ISOSORBIDE MONONITRATE 120 MG: 60 TABLET, EXTENDED RELEASE ORAL at 09:52

## 2025-04-06 RX ADMIN — CALCIUM ACETATE 2001 MG: 667 CAPSULE ORAL at 09:48

## 2025-04-06 RX ADMIN — CETIRIZINE HYDROCHLORIDE 5 MG: 10 TABLET, FILM COATED ORAL at 09:53

## 2025-04-06 RX ADMIN — HEPARIN SODIUM 5000 UNITS: 5000 INJECTION INTRAVENOUS; SUBCUTANEOUS at 14:57

## 2025-04-06 RX ADMIN — HEPARIN SODIUM 5000 UNITS: 5000 INJECTION INTRAVENOUS; SUBCUTANEOUS at 06:28

## 2025-04-06 RX ADMIN — OXYBUTYNIN CHLORIDE 10 MG: 5 TABLET ORAL at 09:53

## 2025-04-06 RX ADMIN — ATORVASTATIN CALCIUM 20 MG: 20 TABLET, FILM COATED ORAL at 09:53

## 2025-04-06 RX ADMIN — FUROSEMIDE 80 MG: 40 TABLET ORAL at 09:53

## 2025-04-06 RX ADMIN — CHOLECALCIFEROL (VITAMIN D3) 10 MCG (400 UNIT) TABLET 2000 UNITS: at 09:53

## 2025-04-06 RX ADMIN — GABAPENTIN 100 MG: 100 CAPSULE ORAL at 09:48

## 2025-04-06 RX ADMIN — LACOSAMIDE 200 MG: 50 TABLET, FILM COATED ORAL at 09:48

## 2025-04-06 RX ADMIN — METOPROLOL SUCCINATE 25 MG: 25 TABLET, EXTENDED RELEASE ORAL at 09:52

## 2025-04-06 RX ADMIN — ASPIRIN 81 MG: 81 TABLET, CHEWABLE ORAL at 09:53

## 2025-04-06 ASSESSMENT — PAIN SCALES - GENERAL
PAINLEVEL_OUTOF10: 0

## 2025-04-06 NOTE — CARE COORDINATION
DISCHARGE PLANNING:  Chart review shows patient is from [facility] as a long term care resident.  Patient refused transportation yesterday back to the facility, stating he wanted to go home.    I attempted to call patients spouse, Yani to discuss new discharge plan and to see if she would be transporting today; unable to reach. VM left with callback number.    I met with patient at bedside to discuss. He states that while he was at [facility], he was completing all ADLs and was even able to walk up 22 steps due to a malfuction with the elevator. He states he has a walker at home and will be able to get to dialysis on TTS at [facility].  We discussed getting PT/OT evals to make sure he was safe enough to go home. He was agreeable. He states that if therapy states he is unsafe to go home then he will go back to the facility.  Therapy evals ordered and CM team paged them to inform them of new discharge delay.    CM team will continue to follow.  Jumana Zapata, RN Case   [phone]

## 2025-04-06 NOTE — CARE COORDINATION
Case Management Assessment            Discharge Note                    Date / Time of Note: 4/6/2025 1:02 PM                  Discharge Note Completed by: TITO LOAIZA    Patient Name: Neo Kwong   YOB: 1960  Diagnosis: Hyperkalemia [E87.5]  ESRD on dialysis (HCC) [N18.6, Z99.2]   Date / Time: 4/4/2025  1:23 PM    Current PCP: Dionicio Earl MD  Clinic patient: No    Hospitalization in the last 30 days: No       Advance Directives:  Code Status: Full Code  [place] DNR form completed and on chart: Not Indicated    Financial:  Payor: [employer] / Plan: [employer] DUAL / Product Type: *No Product type* /      Pharmacy:  No Pharmacies Listed    Assistance purchasing medications?:    Assistance provided by Case Management: None at this time    Does patient want to participate in local refill/ meds to beds program?:      Meds To Beds General Rules:  1. Can ONLY be done Monday- Friday between 8:30am-5pm  2. Prescription(s) must be in pharmacy by 3pm to be filled same day  3.Copy of patient's insurance/ prescription drug card and patient face sheet must be sent along with the prescription(s)  4. Cost of Rx cannot be added to hospital bill. If financial assistance is needed, please contact unit  or ;  or  CANNOT provide pharmacy voucher for patients co-pays  5. Patients can then  the prescription on their way out of the hospital at discharge, or pharmacy can deliver to the bedside if staff is available. (payment due at time of pick-up or delivery - cash, check, or card accepted)     Able to afford home medications/ co-pay costs: Yes    ADLS:  Current PT AM-PAC Score: 18 /24  Current OT AM-PAC Score: 19 /24    DISCHARGE Disposition: Home with Home Health Care: [facility]     LOC at discharge: Skilled  BON Completed: Yes    Notification completed in HENS/PAS?:  Not Applicable    IMM Completed:   Not Indicated due to: first

## 2025-04-06 NOTE — PROGRESS NOTES
Ph: [phone], Fax: [phone]                                     [facility]                                                   [address] [place], [place] [id number]           Reason for admission:                 Brief Summary:     Neo Kwong is being seen by nephrology for ESRD.     Interval History and Plan:   Patient seen at bedside  Comfortable  /63  On room air  Labs reviewed            HD today today again   Will follow with response   2 back to treatments being done   Medical treatment of hyperkalemia ordered while waiting for HD  UF as tolerated as patient is edematous /volume overloaded although on room air  Monitor Hb and lytes.   Follow BP   Epogen with HD  Check iron studies.   Dose medications according to GFR  Advised compliance with RRT as it can be life threatening.         Dialysis orders placed and informed [facility] charge nurse      Thank you for allowing us to participate in this patient's care  In case of any question please call us at our 24 hour answering service [phone] or from 7 AM to 5 PM via [employer], [email or web address], Epic chat or cell phone.   Dr Denny Li MD      Assessment:     ESRD  On HD TTS   patient from [facility]  Access:  AVF in right arm.   Volume status: Volume overloaded.        Hypertension        Anemia        Metabolic acidosis        HPI      Patient is a 64 y.o. male  with PMH significant for ESRD on HD, HTN, anemia non compliant with HD was sent from outpatient dialysis clinic for urgent dialysis per patient. Labs showed hyperkalemia and very high BUN and nephrology consulted for urgency dialysis.       Patient seen at bedside and appear comfortable on room air and denied SOB, chest pain, focal weakness, bleeding, fever, hand pains. Per patient he missed few HD sessions due to transport issues.         ROS:   Negative except as mentioned in HPI      Vitals:     Vitals:    04/04/25 1339   BP: 136/63 
AVS/BON complete. RN called report and spoke with ALEYDA Ferrell. Reviewed AVS with patient - No further questions at this time, verbalized understanding of discharge instructions.    MD notified of redness to right lower extremity. Patient denies pain. No further orders at this time.    [facility] transport picked up patient.  
At discharge patient requested oxycodone. MD notified and stated patient should contact his primary care doctor for prescription. Patient's wife had been called twice by RN with voicemail's left to call RN back. When patient's wife returned phone call, she requested that patient get prescription before he leaves the hospital. MD notified. Correct pharmacy was clarified with patient's wife. MD was notified of pharmacy to send prescription medication to. Confirmation of prescription sent with MD. Patient discharged.  
Contacted patient's wife who stated she would pick the patient up at discharge.     Patient verbalized understanding of AVS with no further questions at this time.     Patient wheeled to main lobby by RN.  
Patient admitted to room 3524 from DIALYSIS. Patient oriented to room, call light, bed rails, phone, lights and bathroom. Patient instructed about the schedule of the day including: vital sign frequency, lab draws, possible tests, frequency of MD and staff rounds, daily weights, I &O's and prescribed diet.  Telemetry box in place, patient aware of placement and reason. Bed locked, in lowest position, side rails up 2/4, call light within reach.     
Physical Therapy  Facility/Department: [address]  Physical Therapy Initial Assessment/Treatment    Name: Neo Kwong  : 1960  MRN: [id number]  Date of Service: 2025    Discharge Recommendations:  Home with assist PRN, Home with Home health PT   PT Equipment Recommendations  Equipment Needed: Yes  Mobility Devices: Walker  Walker: Rolling      Patient Diagnosis(es): The encounter diagnosis was Hyperkalemia.  Past Medical History:  has a past medical history of Chronic obstructive pulmonary disease, unspecified, Difficulty in walking, not elsewhere classified, End stage renal disease (HCC), Essential hypertension, History of malignant neoplasm of prostate, Hyperlipemia, Neuralgia and neuritis, Obstructive sleep apnea, Polyneuropathy, Shortness of breath, Type 2 diabetes mellitus, and Unilateral primary osteoarthritis, right knee.  Past Surgical History:  has no past surgical history on file.    Assessment  Body Structures, Functions, Activity Limitations Requiring Skilled Therapeutic Intervention: Decreased functional mobility   Assessment: 64 y.o. male who presented with missed multiple dialysis episodes. Pt currently requiring CGA-SBA for transfers and amb with RW. Pt is limited by decreased strength, balance and endurance. Pt is below his baseline and would benefit from further skilled PT to maximize safety and independence with functional mobility. Pt hopeful to d/c home with A from wife. Will continue to follow.  Treatment Diagnosis: Decreased functional mobility  Therapy Prognosis: Good  Decision Making: Medium Complexity  Barriers to Learning: none  Requires PT Follow-Up: Yes  Activity Tolerance  Activity Tolerance: Patient tolerated evaluation without incident;Patient tolerated treatment well    Plan  Physical Therapy Plan  General Plan:  (2-5)  Current Treatment Recommendations: Strengthening, Functional mobility training, Balance training, Transfer training, Endurance 
Received call from [facility] because he did not want to go to the nursing home. Patient wanted to go home instead. RN met [facility] staff and patient at [facility] patient elevators where patient was refusing to go with transport team. Patient stated he had a personal ride downstairs and did not want to go to [facility]. RN contacted MD Avina who stated he was out of the hospital, but he would send the physician who was currently in hospital down to see patient. MD Andrade spoke with patient about situation. MD Andrade stated patient had capacity to make own decisions. Patient was alert and oriented today, but sometimes irritable. Patient was then put in wheelchair - RN would take patient to [facility] to his ride. Before we were able to get on main elevator, patient stated hospital staff \"sabotaged his ride\" and that the ride was no longer available. Patient had previously reported to staff that his cousin was his ride and that he was at the lobby, available, to take patient home.    RN called patient's emergency contact, Yani Link. Voicemail left twice. Message was left for Dr. Link to call RN back with callback number.    1839: Spoke with Yani Griffin who stated she is in [place] and she is 4.5 hours away, but will drive home to get patient.    2000: Spoke with patient's wife, Yani Fariba. Notified her that her  would be readmitted.  
This RN has attempted to clean the patient up multiple times throughout this shift and the patient continues to refuse. Educated patient on the potential to develop skin breakdown due to incontinence. Patient continues to refuse being cleaned up.     Patient refused telemetry and PIV placement, Hospitalist on call made aware.   
Treatment time: 2 hours     Net UF: 2 L     Pre weight: 128 kg   Post weight: 126 kg    Access used: Left arm fistula   Access function: tolerated 350 BFR     Medications or blood products given: NA     Regular outpatient schedule: [facility]     Summary of response to treatment: tolerated well. 2K for the first hour, 1k the last hour. Hep labs drawn and sent. DCI closed for the day.     Copy of dialysis treatment record placed in chart, to be scanned into EMR.  
Treatment time: 2 hours  Net UF: 2000 ml     Pre weight: 125.8 kg  Post weight:123.8 kg  EDW: TBD    Access used: RAVF  Access function: well with  ml/min     Medications or blood products given: none     Regular outpatient schedule: TTS     Summary of response to treatment: Patient tolerated treatment well and without any complications. Patient remained stable throughout entire treatment and upon the exiting the dialysis suite via transport.     Report given to Kirsten Gil RN and copy of dialysis treatment record placed in chart, to be scanned into EMR.  
When doing orientation questions with assessment, patient stated \"Tho\", laughed, then stated \"Gustavo Trump\" when asked who the president was. When asked place patient stated \"nursing home\", but then stated [facility] shortly thereafter. Nightshift RN stated patient was alert and oriented overnight. MD aware.  
classified     End stage renal disease (HCC)     Essential hypertension     History of malignant neoplasm of prostate     Hyperlipemia     Neuralgia and neuritis     Obstructive sleep apnea     Polyneuropathy     Shortness of breath     Type 2 diabetes mellitus     Unilateral primary osteoarthritis, right knee        Past Surgical History:     History reviewed. No pertinent surgical history.                
distress  Respiratory:  Normal respiratory effort.   Cardiovascular:  Regular rate and rhythm.  Abdomen:  Soft, non-tender, non-distended.  Musculoskeletal:  Bilateral pitting edema to lower extremities, right lower extremity red, non-tender, likely related to profound swelling  Neurologic:  Non-focal  Psychiatric:  Alert and oriented    BP (!) 141/71   Pulse 55   Temp 98 °F (36.7 °C) (Oral)   Resp 20   Ht 1.549 m (5' 1\")   Wt 131.3 kg (289 lb 6.4 oz)   SpO2 96%   BMI 54.68 kg/m²     Diet: ADULT DIET; Regular    DVT Prophylaxis: []PPx LMWH  [x]SQ Heparin  []IPC/SCDs  []Eliquis  []Xarelto  []Coumadin  [] Heparin Drip  []Other -      Code status: Full Code    PT/OT Eval Status:   []NOT yet ordered  []Ordered and Pending   [x]Seen with Recommendations for:   []Home independently  [x]Home w/ assist  []HHC  []SNF  []Acute Rehab    Multi-Disciplinary Rounds with Case Management completed on 4/6/2025 with the following recommendations:  Anticipated Discharge Location: [x]Home w/ []HHC vs []SNF  []Acute Rehab  []LTAC  []Hospice  []Other -    Anticipated Discharge Day/Date:  today  Barriers to Discharge: none  --------------------------------------------------    MDM (any 2 required for High level billing)    A. Problems (any 1)  [] Acute/Chronic Illness/injury posing ongoing threat to life and/or bodily function without ongoing treatment    [] Severe exacerbation of chronic illness    --------------------------------------------------  B. Risk of Treatment (any 1)    [x] Drugs/treatments that require intensive monitoring for toxicity    [] IV ABX (Vancomycin, Aminoglycosides, etc)     [] Post-Cath/Contrast study requiring serial monitoring    [] IV Narcotic analgesia    [] Aggressive IV diuresis    [] Hypertonic Saline    [] Critical electrolyte abnormalities requiring IV replacement    [x] Insulin - Scheduled/SSI or Insulin gtt    [] Anticoagulation (Heparin gtt or Coumadin - other anticoagulants in special 
Demonstration;Verbal  Barriers to Learning: None  Education Outcome: Verbalized understanding;Demonstrated understanding                                                                  AM-PAC - ADL  AM-PAC Daily Activity - Inpatient   How much help is needed for putting on and taking off regular lower body clothing?: A Lot  How much help is needed for bathing (which includes washing, rinsing, drying)?: A Little  How much help is needed for toileting (which includes using toilet, bedpan, or urinal)?: A Little  How much help is needed for putting on and taking off regular upper body clothing?: A Little  How much help is needed for taking care of personal grooming?: None  How much help for eating meals?: None  AM-PAC Inpatient Daily Activity Raw Score: 19  AM-PAC Inpatient ADL T-Scale Score : 40.22  ADL Inpatient CMS 0-100% Score: 42.8  ADL Inpatient CMS G-Code Modifier : CK    Tinneti Score       Goals  Short Term Goals  Time Frame for Short Term Goals: DC  Short Term Goal 1: Pt will complete toilet transfer mod I-not met  Short Term Goal 2: Pt will complete LB dressing with CGA-not met      Therapy Time   Individual Concurrent Group Co-treatment   Time In 1110         Time Out 1150         Minutes 40               Timed code tx minutes:25  Total tx minutes:40    Dayna Wilson, OT

## 2025-04-06 NOTE — PLAN OF CARE
Problem: Gastrointestinal - Adult  Goal: Minimal or absence of nausea and vomiting  Outcome: Progressing  Note: Pt denies nausea.      Problem: Genitourinary - Adult  Goal: Absence of urinary retention  Outcome: Progressing  Note: Patient received lasix this evening, stated he went to the bathroom but no urine it urinal.      Problem: Skin/Tissue Integrity - Adult  Goal: Skin integrity remains intact  Outcome: Progressing  Flowsheets (Taken 4/6/2025 0457)  Skin Integrity Remains Intact: Monitor for areas of redness and/or skin breakdown  Note: Patient refusing to be cleaned up to access for skin breakdown.      Problem: Respiratory - Adult  Goal: Achieves optimal ventilation and oxygenation  Outcome: Progressing  Flowsheets (Taken 4/6/2025 0457)  Achieves optimal ventilation and oxygenation: Assess for changes in respiratory status  Note: Patient continues on room air throughout this shift.

## 2025-04-09 NOTE — ED PROVIDER NOTES
ED Attending Attestation Note     Date of evaluation: 4/4/2025    This patient was seen by the resident.  I have seen and examined the patient, agree with the workup, evaluation, management and diagnosis. The care plan has been discussed.  I have reviewed the ECG and concur with the resident's interpretation.  My assessment reveals 64-year-old male who has been abstinent for analysis for the last week or so.  He comes in today saying that he feels a little bit swollen, but is mostly concerned about his potassium level.  Denies shortness of breath.  He does have swelling in his bilateral lower extremities is fairly extensive-it is unclear if the associated redness due to cellulitis or a secondary product of increased swelling with some venous insufficiency.  Anticipate that he will require dialysis this afternoon they will need to be admitted to the hospital.     Irvin Allen MD  04/04/25 8688    
dailyHistorical Med      Tenapanor HCl, CKD, (XPHOZAH) 30 MG TABS Take 30 mg by mouth nightlyHistorical Med      fluticasone-salmeterol (ADVAIR) 250-50 MCG/ACT AEPB diskus inhaler Inhale 1 puff into the lungs in the morning and 1 puff in the evening.Historical Med      oxyCODONE (ROXICODONE) 5 MG immediate release tablet Take 1 tablet by mouth every 4 hours as needed for Pain.Historical Med             Allergies     He is allergic to shellfish-derived products.    Physical Exam     INITIAL VITALS: BP: 136/63, Temp: 97.9 °F (36.6 °C), Pulse: 66, Respirations: 18, SpO2: 95 %     General: 64 y.o. male, well-appearing; in no apparent distress.  Head: Normocephalic, atraumatic.   Eyes: Anicteric. PERRL, EOMI.  ENT: No nasal discharge. Mucous membranes are moist.  Neck: Supple, full ROM, trachea midline.   Pulmonary: Non-labored breathing. Lungs clear to auscultation bilaterally with symmetric aeration. No wheezes/rales/rhonchi.   Cardiac: Regular rate and rhythm. No murmurs/rubs/gallops.  Abdomen: Soft, non-tender, non-distended. No rebound or guarding.  Musculoskeletal: No long bone extremity deformity.   Extremities: Warm and well-perfused. 3+ pitting edema of the bilateral reds with diffuse erythema. No warmth.  Skin: No rashes or bruising.  Neuro: Alert and oriented x3. Speech normal. Moves UE and LE spontaneously and symmetrically.  Psych: Mood and affect appropriate for situation.           Gary Tate MD  Resident  04/09/25 4980